# Patient Record
Sex: FEMALE | Race: WHITE | Employment: UNEMPLOYED | URBAN - METROPOLITAN AREA
[De-identification: names, ages, dates, MRNs, and addresses within clinical notes are randomized per-mention and may not be internally consistent; named-entity substitution may affect disease eponyms.]

---

## 2023-12-04 ENCOUNTER — HOSPITAL ENCOUNTER (EMERGENCY)
Age: 23
Discharge: HOME | End: 2023-12-04
Attending: EMERGENCY MEDICINE
Payer: OTHER MISCELLANEOUS

## 2023-12-04 VITALS
SYSTOLIC BLOOD PRESSURE: 139 MMHG | OXYGEN SATURATION: 100 % | BODY MASS INDEX: 23.04 KG/M2 | TEMPERATURE: 99 F | WEIGHT: 130 LBS | HEIGHT: 63 IN | HEART RATE: 83 BPM | RESPIRATION RATE: 20 BRPM | DIASTOLIC BLOOD PRESSURE: 98 MMHG
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
## 2023-12-04 PROCEDURE — 99283 EMERGENCY DEPT VISIT LOW MDM: CPT

## 2023-12-04 ASSESSMENT — PAIN SCALES - GENERAL: PAINLEVEL_OUTOF10: 2

## 2023-12-04 ASSESSMENT — PAIN DESCRIPTION - LOCATION: LOCATION: NECK;BACK;LEG

## 2023-12-04 ASSESSMENT — PAIN - FUNCTIONAL ASSESSMENT
PAIN_FUNCTIONAL_ASSESSMENT: 0-10
PAIN_FUNCTIONAL_ASSESSMENT: PREVENTS OR INTERFERES SOME ACTIVE ACTIVITIES AND ADLS

## 2023-12-04 ASSESSMENT — PAIN DESCRIPTION - ORIENTATION: ORIENTATION: LEFT

## 2023-12-04 ASSESSMENT — PAIN DESCRIPTION - DESCRIPTORS: DESCRIPTORS: ACHING

## 2023-12-04 NOTE — ED NOTES
Patient discharged to home at this time. Discharge instructions and follow up care discussed, patient voices understanding.        Raymundo Jones RN  12/04/23 [id number]

## 2023-12-04 NOTE — DISCHARGE INSTRUCTIONS
You may take ibuprofen/Tylenol as needed for pain  If you having progressively worsening headache, neck pain, chest or abdominal pain or back pain or shortness of breath or weakness of the extremities or loss of vision return to ER
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
## 2024-09-01 ENCOUNTER — HOSPITAL ENCOUNTER (EMERGENCY)
Age: 24
Discharge: HOME | End: 2024-09-01
Payer: COMMERCIAL

## 2024-09-01 VITALS
RESPIRATION RATE: 18 BRPM | OXYGEN SATURATION: 100 % | TEMPERATURE: 98.9 F | DIASTOLIC BLOOD PRESSURE: 81 MMHG | BODY MASS INDEX: 20.91 KG/M2 | HEART RATE: 74 BPM | SYSTOLIC BLOOD PRESSURE: 118 MMHG | WEIGHT: 118 LBS | HEIGHT: 63 IN

## 2024-09-01 DIAGNOSIS — J34.0 NASAL ABSCESS: Primary | ICD-10-CM

## 2024-09-01 PROCEDURE — 87070 CULTURE OTHR SPECIMN AEROBIC: CPT

## 2024-09-01 PROCEDURE — 87077 CULTURE AEROBIC IDENTIFY: CPT

## 2024-09-01 PROCEDURE — 87075 CULTR BACTERIA EXCEPT BLOOD: CPT

## 2024-09-01 PROCEDURE — 87186 SC STD MICRODIL/AGAR DIL: CPT

## 2024-09-01 PROCEDURE — 99283 EMERGENCY DEPT VISIT LOW MDM: CPT

## 2024-09-01 PROCEDURE — 6370000000 HC RX 637 (ALT 250 FOR IP): Performed by: PHYSICIAN ASSISTANT

## 2024-09-01 RX ORDER — HYDROCODONE BITARTRATE AND ACETAMINOPHEN 5; 325 MG/1; MG/1
1 TABLET ORAL EVERY 6 HOURS PRN
Qty: 10 TABLET | Refills: 0 | Status: SHIPPED | OUTPATIENT
Start: 2024-09-01 | End: 2024-09-04

## 2024-09-01 RX ORDER — DOXYCYCLINE HYCLATE 100 MG
100 TABLET ORAL 2 TIMES DAILY
Qty: 20 TABLET | Refills: 0 | Status: SHIPPED | OUTPATIENT
Start: 2024-09-01 | End: 2024-09-11

## 2024-09-01 RX ORDER — LIDOCAINE/PRILOCAINE 2.5 %-2.5%
CREAM (GRAM) TOPICAL ONCE
Status: COMPLETED | OUTPATIENT
Start: 2024-09-01 | End: 2024-09-01

## 2024-09-01 RX ORDER — HYDROCODONE BITARTRATE AND ACETAMINOPHEN 5; 325 MG/1; MG/1
1 TABLET ORAL ONCE
Status: COMPLETED | OUTPATIENT
Start: 2024-09-01 | End: 2024-09-01

## 2024-09-01 RX ORDER — DOXYCYCLINE HYCLATE 100 MG
100 TABLET ORAL ONCE
Status: COMPLETED | OUTPATIENT
Start: 2024-09-01 | End: 2024-09-01

## 2024-09-01 RX ORDER — MUPIROCIN 20 MG/G
OINTMENT TOPICAL
Qty: 15 G | Refills: 0 | Status: SHIPPED | OUTPATIENT
Start: 2024-09-01 | End: 2024-09-08

## 2024-09-01 RX ADMIN — LIDOCAINE AND PRILOCAINE: 25; 25 CREAM TOPICAL at 19:37

## 2024-09-01 RX ADMIN — HYDROCODONE BITARTRATE AND ACETAMINOPHEN 1 TABLET: 5; 325 TABLET ORAL at 19:37

## 2024-09-01 RX ADMIN — DOXYCYCLINE HYCLATE 100 MG: 100 TABLET, COATED ORAL at 19:36

## 2024-09-01 ASSESSMENT — LIFESTYLE VARIABLES
HOW OFTEN DO YOU HAVE A DRINK CONTAINING ALCOHOL: NEVER
HOW MANY STANDARD DRINKS CONTAINING ALCOHOL DO YOU HAVE ON A TYPICAL DAY: PATIENT DOES NOT DRINK

## 2024-09-01 ASSESSMENT — PAIN SCALES - GENERAL: PAINLEVEL_OUTOF10: 5

## 2024-09-01 NOTE — ED TRIAGE NOTES
Says a few days ago, nose started swelling. Hit it her nose in her sleep 2 nights ago, painful and pus came out. Redness and swelling noticed. Says her lymph nodes feel swollen in throat as well. Can't breathe out of that side. Painful to touch.
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
## 2024-09-02 NOTE — ED PROVIDER NOTES
EMERGENCY DEPARTMENT ENCOUNTER        Pt Name: Tracy Abdi  MRN: [id number]  Birthdate 2000  Date of evaluation: 9/1/2024  Provider: Debbie Arriaga PA-C  PCP: No primary care provider on file.    MARIA ALEJANDRA. I have evaluated this patient.        Triage CHIEF COMPLAINT       Chief Complaint   Patient presents with    Abscess     Nose, 3 or 4 days;          HISTORY OF PRESENT ILLNESS      Chief Complaint: Nasal swelling, drainage    Tracy Abdi is a 23 y.o. female who presents with concern for nasal infection.  Patient states she developed a pimple to the nose about 4 days ago.  During the night, she hit her nose and felt pain, states it began \"pouring pus.\"  Since then, swelling and redness have worsened.  She has some swollen lymph nodes in her neck.  She states her boyfriend told her she felt hot earlier today but she does not have a thermometer to check her temperature.         Nursing Notes were all reviewed and agreed with or any disagreements were addressed in the HPI.    REVIEW OF SYSTEMS     CONSTITUTIONAL:  Denies fever.  EYES:  Denies visual changes.  HEAD:  Denies headache.  ENT:  + nasal infection.  NECK:  Denies neck pain.  RESPIRATORY:  Denies any shortness of breath.  CARDIOVASCULAR:  Denies chest pain.  GI:  Denies nausea or vomiting.    :  Denies urinary symptoms.  MUSCULOSKELETAL:  Denies extremity pain or swelling.  BACK:  Denies back pain.  INTEGUMENT:  Denies skin changes.  LYMPHATIC:  Denies lymphadenopathy.  NEUROLOGIC:  Denies any numbness/tingling.  PSYCHIATRIC:  Denies SI/HI.    PAST MEDICAL HISTORY   History reviewed. No pertinent past medical history.    SURGICAL HISTORY   History reviewed. No pertinent surgical history.    CURRENTMEDICATIONS       Discharge Medication List as of 9/1/2024  9:21 PM          ALLERGIES     Patient has no known allergies.    FAMILYHISTORY     History reviewed. No pertinent family history.     SOCIAL HISTORY       Social History     Socioeconomic History

## 2024-09-06 ENCOUNTER — TELEPHONE (OUTPATIENT)
Dept: PHARMACY | Age: 24
End: 2024-09-06

## 2024-09-06 LAB
CULTURE: ABNORMAL
Lab: ABNORMAL
SPECIMEN: ABNORMAL

## 2024-09-06 NOTE — PROGRESS NOTES
Pharmacy Note  ED Culture Follow-up    Tracy Abdi is a 24 y.o. female.     Allergies: Patient has no known allergies.     Labs:  No results found for: \"BUN\", \"CREATININE\", \"WBC\"  CrCl cannot be calculated (No successful lab value found.).    Current antimicrobials:   Doxycycline     ASSESSMENT:  Micro results:   Wound culture: positive for MRSA and K. Aerogenes      PLAN:  Need for intervention: Yes  Discussed with: Dr. Siddiqi  Chosen treatment:    Attempted to contact the patient to inform of result and therapy change. The phone number in the chart is invalid/disconnected and unable to reach patient.  Recommendation is to discontinue doxycyline and initiate Bactrim 1 DS tablet by mouth twice daily for 7 days    Patient response:   Unable to reach patient, letter sent on 09/06/2024      Called/sent in prescription to: Not applicable    Please call with any questions. Ext. [phone]    Maria G Tate RPH, PharmD 12:57 PM 9/6/2024

## 2024-09-06 NOTE — TELEPHONE ENCOUNTER
Pharmacy Note  ED Culture Follow-up    Tracy Abdi is a 24 y.o. female.     Allergies: Patient has no known allergies.     Labs:  No results found for: \"BUN\", \"CREATININE\", \"WBC\"  CrCl cannot be calculated (No successful lab value found.).    Current antimicrobials:   Doxycycline     ASSESSMENT:  Micro results:   Wound culture: positive for MRSA and K. Aerogenes      PLAN:  Need for intervention: Yes  Discussed with: Dr. Siddiqi  Chosen treatment:    Attempted to contact the patient to inform of result and therapy change. The phone number in the chart is invalid/disconnected and unable to reach patient.  Recommendation is to discontinue doxycyline and initiate Bactrim 1 DS tablet by mouth twice daily for 7 days    Patient response:   Unable to reach patient, letter sent on 09/06/2024      Called/sent in prescription to: Not applicable    Please call with any questions. Ext. [phone]    Maria G Tate RPH, PharmD 12:57 PM 9/6/2024

## 2024-09-23 ENCOUNTER — HOSPITAL ENCOUNTER (EMERGENCY)
Age: 24
Discharge: LEFT BEFORE DISCHARGE | End: 2024-09-23
Payer: COMMERCIAL

## 2024-09-23 VITALS
SYSTOLIC BLOOD PRESSURE: 131 MMHG | DIASTOLIC BLOOD PRESSURE: 92 MMHG | HEART RATE: 84 BPM | OXYGEN SATURATION: 100 % | TEMPERATURE: 98.1 F | RESPIRATION RATE: 16 BRPM

## 2024-09-23 DIAGNOSIS — L02.213 CHEST WALL ABSCESS: Primary | ICD-10-CM

## 2024-09-23 PROCEDURE — 6360000002 HC RX W HCPCS

## 2024-09-23 PROCEDURE — 99283 EMERGENCY DEPT VISIT LOW MDM: CPT

## 2024-09-23 RX ORDER — DOXYCYCLINE HYCLATE 100 MG
100 TABLET ORAL 2 TIMES DAILY
Qty: 14 TABLET | Refills: 0 | Status: SHIPPED | OUTPATIENT
Start: 2024-09-23 | End: 2024-09-30

## 2024-09-23 RX ORDER — CEPHALEXIN 500 MG/1
500 CAPSULE ORAL 2 TIMES DAILY
Qty: 14 CAPSULE | Refills: 0 | Status: SHIPPED | OUTPATIENT
Start: 2024-09-23 | End: 2024-09-30

## 2024-09-23 RX ORDER — BUPIVACAINE HYDROCHLORIDE 5 MG/ML
30 INJECTION, SOLUTION EPIDURAL; INTRACAUDAL ONCE
Status: COMPLETED | OUTPATIENT
Start: 2024-09-23 | End: 2024-09-23

## 2024-09-23 RX ADMIN — BUPIVACAINE HYDROCHLORIDE 150 MG: 5 INJECTION, SOLUTION EPIDURAL; INTRACAUDAL; PERINEURAL at 16:01

## 2024-09-23 ASSESSMENT — PAIN DESCRIPTION - LOCATION: LOCATION: BREAST

## 2024-09-23 ASSESSMENT — PAIN SCALES - GENERAL: PAINLEVEL_OUTOF10: 10

## 2024-09-23 ASSESSMENT — PAIN - FUNCTIONAL ASSESSMENT: PAIN_FUNCTIONAL_ASSESSMENT: 0-10

## 2024-09-29 ENCOUNTER — HOSPITAL ENCOUNTER (EMERGENCY)
Age: 24
Discharge: HOME | End: 2024-09-29
Payer: COMMERCIAL

## 2024-09-29 VITALS
RESPIRATION RATE: 18 BRPM | TEMPERATURE: 98.5 F | OXYGEN SATURATION: 100 % | HEART RATE: 82 BPM | DIASTOLIC BLOOD PRESSURE: 83 MMHG | SYSTOLIC BLOOD PRESSURE: 131 MMHG

## 2024-09-29 DIAGNOSIS — F32.A DEPRESSION, UNSPECIFIED DEPRESSION TYPE: ICD-10-CM

## 2024-09-29 DIAGNOSIS — L02.213 CHEST WALL ABSCESS: Primary | ICD-10-CM

## 2024-09-29 PROCEDURE — 99282 EMERGENCY DEPT VISIT SF MDM: CPT

## 2024-09-29 PROCEDURE — 10060 I&D ABSCESS SIMPLE/SINGLE: CPT

## 2024-09-29 ASSESSMENT — PAIN DESCRIPTION - DESCRIPTORS: DESCRIPTORS: ACHING

## 2024-09-29 ASSESSMENT — PAIN DESCRIPTION - LOCATION: LOCATION: CHEST

## 2024-09-29 ASSESSMENT — PAIN SCALES - GENERAL
PAINLEVEL_OUTOF10: 1
PAINLEVEL_OUTOF10: 0

## 2024-09-29 ASSESSMENT — PAIN - FUNCTIONAL ASSESSMENT: PAIN_FUNCTIONAL_ASSESSMENT: 0-10

## 2024-09-30 NOTE — DISCHARGE INSTRUCTIONS
Keep the incision and drainage site clean and dry.  You may need to change your dressings several times per day.  It is normal to have continued drainage, however if you notice uncontrolled bleeding or any signs of worsening infection, please see your physician or return to the ER right away.  The incision site should heal on its own with time however if you have any concerns, please follow up to ensure proper healing and resolution of the infection.        _____________________________________________________________________________  OUTPATIENT MENTAL HEALTH SERVICES    [facility] [phone], [address] / Walk-in appt. Mon-Fri 9am-1pm, First 5 taken for walk-in appt.     [facility] [phone], [address], [facility] [phone], [address] [place] [address], offers a wide variety of services to the community. ([email or web address])

## 2024-09-30 NOTE — ED PROVIDER NOTES
131/83  Pulse: 82             PHYSICAL EXAM  1 or more Elements     Physical Exam  Musculoskeletal:        Arms:       Comments: 2x3 centimeter area of erythema with palpable fluctuance and induration but does not extend into the right breast along the right upper chest wall, pinpoint center          CONSTITUTIONAL: Awake and alert, oriented, appears non-toxic  HENT: Atraumatic, normocephalic, airway patent  EYES: Conjunctiva clear, pupils equal, round,  NECK: no masses, trachea midline  PULMONARY/CHEST: Clear to auscultation bilaterally, Non-tender.  ABDOMINAL: Non-distended, soft, non-tender  NEUROLOGIC: Non-focal   EXTREMITIES: No edema  SKIN: Warm, Dry      DIAGNOSTIC RESULTS   LABS:    Labs Reviewed - No data to display    When ordered only abnormal lab results are displayed. All other labs were within normal range or not returned as of this dictation.    EKG: When ordered, EKG's are interpreted by the Emergency Department Physician in the absence of a cardiologist.  Please see their note for interpretation of EKG.    RADIOLOGY:   Non-plain film images such as CT, Ultrasound and MRI are read by the radiologist. Plain radiographic images are visualized and preliminarily interpreted by the ED Provider with the below findings:    Interpretation per the Radiologist below, if available at the time of this note:    No orders to display     No results found.     No results found.    PROCEDURES   Unless otherwise noted below, none     Incision/Drainage    Date/Time: 9/29/2024 9:45 PM    Performed by: Alberto Chu PA-C  Authorized by: Alberto Chu PA-C    Consent:     Consent obtained:  Verbal    Consent given by:  Patient and parent    Risks, benefits, and alternatives were discussed: yes      Risks discussed:  Bleeding, incomplete drainage, pain and damage to other organs    Alternatives discussed:  Delayed treatment  Universal protocol:     Procedure explained and questions answered to patient or proxy's

## 2024-09-30 NOTE — CARE COORDINATION
CM review of pt chart for discharge needs for o/p resources and follow up. Community resource information placed in pt AVS. TIRN/CM

## 2024-09-30 NOTE — ED NOTES
Pt also states that she does not have a PCP and has no been on her anti-depressants for awhile. Would like to speak with someone about getting back on medication.